# Patient Record
Sex: FEMALE | Race: WHITE
[De-identification: names, ages, dates, MRNs, and addresses within clinical notes are randomized per-mention and may not be internally consistent; named-entity substitution may affect disease eponyms.]

---

## 2019-03-29 ENCOUNTER — HOSPITAL ENCOUNTER (EMERGENCY)
Dept: HOSPITAL 10 - FTE | Age: 19
Discharge: HOME | End: 2019-03-29
Payer: SELF-PAY

## 2019-03-29 ENCOUNTER — HOSPITAL ENCOUNTER (EMERGENCY)
Dept: HOSPITAL 91 - FTE | Age: 19
Discharge: HOME | End: 2019-03-29
Payer: SELF-PAY

## 2019-03-29 VITALS
HEIGHT: 61 IN | WEIGHT: 130.29 LBS | HEIGHT: 61 IN | BODY MASS INDEX: 24.6 KG/M2 | BODY MASS INDEX: 24.6 KG/M2 | WEIGHT: 130.29 LBS

## 2019-03-29 VITALS — SYSTOLIC BLOOD PRESSURE: 128 MMHG | DIASTOLIC BLOOD PRESSURE: 78 MMHG | RESPIRATION RATE: 16 BRPM | HEART RATE: 77 BPM

## 2019-03-29 DIAGNOSIS — R30.0: Primary | ICD-10-CM

## 2019-03-29 LAB
URINE LEUKOCYTE EST (DIP) POC: (no result)
URINE PH (DIP) POC: 7 (ref 5–8.5)

## 2019-03-29 PROCEDURE — 96372 THER/PROPH/DIAG INJ SC/IM: CPT

## 2019-03-29 PROCEDURE — 99284 EMERGENCY DEPT VISIT MOD MDM: CPT

## 2019-03-29 PROCEDURE — 81025 URINE PREGNANCY TEST: CPT

## 2019-03-29 PROCEDURE — 81003 URINALYSIS AUTO W/O SCOPE: CPT

## 2019-03-29 RX ADMIN — KETOROLAC TROMETHAMINE 1 MG: 30 INJECTION, SOLUTION INTRAMUSCULAR at 10:18

## 2019-03-29 NOTE — ERD
ER Documentation


Chief Complaint


Chief Complaint





DYSURIA AND LOWER ABD PAIN X 3 DAYS.





HPI


18-year-old female patient with no significant past medical history presents to 


ED complaining of dysuria that started 3 days ago.  Reports that she had some 


suprapubic pain that she describes as sharp.  Hurts when she is pain, she has a 


burning sensation.  Patient rates her pain a 5 out of 10.  Reports that she just


got an IUD about 2 months ago however she is not complaining of any vaginal 


bleeding or vaginal discharge, abdominal pain, nausea, vomiting, fever.  Patient


reports that she is sexually active, unprotected sex.  Denies any concerns for 


STDs.  Is any chest pain, shortness of breath.





ROS


All systems reviewed and are negative except as per history of present illness.





Medications


Home Meds


Active Scripts


Nitrofurantoin Monohyd Macrocr (Macrobid) 100 Mg Capsr, 100 MG PO BID for 7 


Days, CAP


   Prov:KELVIN COHEN PA-C         3/29/19


Phenazopyridine Hcl* (Pyridium*) 100 Mg Tab, 100 MG PO TID PRN for URINARY PAIN,


#8 TAB


   Prov:KELVIN COHEN PA-C         3/29/19





PMhx/Soc


Medical and Surgical Hx:  pt denies Medical Hx, pt denies Surgical Hx


Hx Alcohol Use:  No


Hx Substance Use:  No


Hx Tobacco Use:  No


Smoking Status:  Never smoker





FmHx


Family History:  No diabetes, No coronary disease





Physical Exam


Vitals





Vital Signs


  Date      Temp  Pulse  Resp  B/P (MAP)   Pulse Ox  O2          O2 Flow    FiO2


Time                                                 Delivery    Rate


   3/29/19  97.6     67    16      136/80        99


     08:01                           (98)





Physical Exam


Const: Non-ill-appearing, well-nourished. In no acute distress.


Head: Atraumatic, normocephalic 


Eyes: Normal Conjunctiva without injection. No purulent discharge. 


ENT: Normal external ear, nose. Moist oropharynx without tonsillar exudates. Non


-erythematous pharynx. Uvula midline. No drooling.  No trismus. 


Neck: No cervical midline tenderness.  Full range of motion. No meningismus. No 


cervical lymphadenopathy. No JVD.


Resp: Clear to auscultation bilaterally. No wheezing, rhonchi, rales, or crac


kles. No accessory muscle use. No retractions.


Cardio: Regular rate and rhythm.  No murmurs, rubs or gallops.


Abd: Soft, slight suprapubic tenderness non distended. Normal bowel sounds.  No 


palpable masses.  No rebound tenderness.  No guarding.  Negative McBurney's 


point. Negative psoas sign. Negative obturator sign.


Skin: No petechiae or rashes


Back: No midline tenderness. No CVA tenderness.


Ext: No cyanosis, or edema.


Neur: Awake and alert. Normal gait. Normal coordination. 


Psych: Normal Mood and Affect


Results 24 hrs





Laboratory Tests


       Test
                                3/29/19
10:00  3/29/19
10:01


       Bedside Urine pH (LAB)                        7.0


       Bedside Urine Protein (LAB)          Negative


       Bedside Urine Glucose (UA)           Negative


       Bedside Urine Ketones (LAB)          Negative


       Bedside Urine Blood                  Trace-lysed


       Bedside Urine Nitrite (LAB)          Negative


       Bedside Urine Leukocyte
Esterase (L           3+ 
  



       POC Beta HCG, Qualitative                           NEGATIVE





Current Medications


 Medications
   Dose
          Sig/Mary
       Start Time
   Status  Last


 (Trade)       Ordered        Route
 PRN     Stop Time              Admin
Dose


                              Reason                                Admin


 Ketorolac
     30 mg          ONCE  STAT
    3/29/19       DC           3/29/19


Tromethamine
                 IM
            09:45
                       10:18



 (Toradol)                                   3/29/19 09:47








Procedures/MDM


18-year-old female patient with no significant past medical history presents to 


ED complaining of dysuria, burning with urination.  Patient is afebrile and n


ontoxic-appearing.  Patient was noted to have 3+ leukocyte esterase noted on her


urine dip.  Urine pregnancy negative.  Patient likely has a urinary tract 


infection.  Patient is appropriate for outpatient management with antibiotics 


and Pyridium.  Low suspicion for ectopic pregnancy, ovarian torsion, gastritis, 


GERD, peptic ulcer disease, cholecystitis, choledocholithiasis, cholangitis, 


pancreatitis, appendicitis, bowel obstruction, ileus, volvulus, nephrolithiasis,


pyelonephritis, hepatitis, perforated viscus, diverticulitis, 


strangulated/incarcerated hernia, DKA, acute abdomen, mesenteric ischemia or 


other emergent conditions.





Diagnosis: Dysuria


Discharge medications: Macrobid, Pyridium


Follow up with primary care physician in 1-2 days for referral to gas


troenterologist. Instructed patient to return to the ED sooner for any worsening


symptoms. Patient's questions were answered. Patient understood and agreed with 


discharge plan. Patient discharged stable.





Departure


Diagnosis:  


   Primary Impression:  


   Dysuria


Condition:  Stable


Patient Instructions:  Dysuria, Urinary Tract Infections in Women


Referrals:  


Formerly [facility], [facility]


YOU HAVE RECEIVED A MEDICAL SCREENING EXAM AND THE RESULTS INDICATE THAT YOU DO 


NOT HAVE A CONDITION THAT REQUIRES URGENT TREATMENT IN THE EMERGENCY DEPARTMENT.





FURTHER EVALUATION AND TREATMENT OF YOUR CONDITION CAN WAIT UNTIL YOU ARE SEEN 


IN YOUR DOCTORS OFFICE WITHIN THE NEXT 1-2 DAYS. IT IS YOUR RESPONSIBILITY TO 


MAKE AN APPOINTMENT FOR FOLOW-UP CARE.





IF YOU HAVE A PRIMARY DOCTOR


--you should call your primary doctor and schedule an appointment





IF YOU DO NOT HAVE A PRIMARY DOCTOR YOU CAN CALL OUR PHYSICIAN REFERRAL HOTLINE 


AT


 [phone] 





IF YOU CAN NOT AFFORD TO SEE A PHYSICIAN YOU CAN CHOSE FROM THE FOLLOWING 


[facility] [phone] [address] [facility] [phone] [address] [facility]. [facility] [phone] [address] [facility] [phone] [address] [facility] [phone] [address]. [facility]. [phone] [address] [facility]


YOU HAVE RECEIVED A MEDICAL SCREENING EXAM AND THE RESULTS INDICATE THAT YOU DO 


NOT HAVE A CONDITION THAT REQUIRES URGENT TREATMENT IN THE EMERGENCY DEPARTMENT.





FURTHER EVALUATION AND TREATMENT OF YOUR CONDITION CAN WAIT UNTIL YOU ARE SEEN 


IN YOUR DOCTORS OFFICE WITHIN THE NEXT 1-2 DAYS. IT IS YOUR RESPONSIBILITY TO 


MAKE AN APPOINTMENT FOR FOLOW-UP CARE.





IF YOU HAVE A PRIMARY DOCTOR


--you should call your primary doctor and schedule and appointment





IF YOU DO NOT HAVE A PRIMARY DOCTOR YOU CAN CALL OUR PHYSICIAN REFERRAL HOTLINE 


AT [phone].





IF YOU CAN NOT AFFORD TO SEE A PHYSICIAN YOU CAN CHOSE FROM THE FOLLOWING [facility]:





[facility]


[address] [place] [address]





[facility]


[address] [place] [address]





[facility]


[address] [place] [address]





[facility]





Additional Instructions:  


Call your primary care doctor TOMORROW for an appointment during the next 2-3 


days.See the doctor sooner or return here if your condition worsens before your 


appointment time.











KELVIN COHEN PA-C              Mar 29, 2019 10:48

## 2019-04-11 ENCOUNTER — HOSPITAL ENCOUNTER (EMERGENCY)
Dept: HOSPITAL 10 - FTE | Age: 19
Discharge: HOME | End: 2019-04-11
Payer: SELF-PAY

## 2019-04-11 ENCOUNTER — HOSPITAL ENCOUNTER (EMERGENCY)
Dept: HOSPITAL 91 - FTE | Age: 19
Discharge: HOME | End: 2019-04-11
Payer: SELF-PAY

## 2019-04-11 VITALS — SYSTOLIC BLOOD PRESSURE: 104 MMHG | HEART RATE: 53 BPM | RESPIRATION RATE: 20 BRPM | DIASTOLIC BLOOD PRESSURE: 56 MMHG

## 2019-04-11 VITALS
WEIGHT: 128.75 LBS | HEIGHT: 66 IN | BODY MASS INDEX: 20.69 KG/M2 | WEIGHT: 128.75 LBS | BODY MASS INDEX: 20.69 KG/M2 | HEIGHT: 66 IN

## 2019-04-11 DIAGNOSIS — N39.0: Primary | ICD-10-CM

## 2019-04-11 LAB
ADD UMIC: YES
UR ASCORBIC ACID: 20 MG/DL
UR BACTERIA: (no result) /HPF
UR BILIRUBIN (DIP): NEGATIVE MG/DL
UR BLOOD (DIP): NEGATIVE MG/DL
UR CLARITY: CLEAR
UR COLOR: YELLOW
UR GLUCOSE (DIP): NEGATIVE MG/DL
UR KETONES (DIP): NEGATIVE MG/DL
UR LEUKOCYTE ESTERASE (DIP): (no result) LEU/UL
UR NITRITE (DIP): NEGATIVE MG/DL
UR PH (DIP): 8 (ref 5–9)
UR RBC: 0 /HPF (ref 0–5)
UR SPECIFIC GRAVITY (DIP): 1 (ref 1–1.03)
UR TOTAL PROTEIN (DIP): NEGATIVE MG/DL
UR UROBILINOGEN (DIP): NEGATIVE MG/DL
UR WBC: 33 /HPF (ref 0–5)

## 2019-04-11 PROCEDURE — 81025 URINE PREGNANCY TEST: CPT

## 2019-04-11 PROCEDURE — 99283 EMERGENCY DEPT VISIT LOW MDM: CPT

## 2019-04-11 PROCEDURE — 81001 URINALYSIS AUTO W/SCOPE: CPT

## 2019-04-11 NOTE — ERD
ER Documentation


Chief Complaint


Chief Complaint





Complains of urine problems x 1 week





HPI


17yo F presents with complaint of dysuria and suprapubic pressure x 1 week. Pt 


previously presented 2 weeks ago for similar symptoms and was given macrobid and


pyridium. Pt notes improvement in symptoms, while on treatment but notes 


symptoms returned 1 week ago. Pt admits to bubble bath prior to onset. Hx of 


IUD. Denies pelvic pain, denies vaginal bleeding or clotting.





ROS


All systems reviewed and are negative except as per history of present illness.





Medications


Home Meds


Active Scripts


Phenazopyridine Hcl* (Pyridium*) 100 Mg Tab, 100 MG PO TID PRN for DYSURIA for 2


Days, #6 TAB


   Prov:DYLAN FOSTER PA-C         4/11/19


Sulfamethoxazole/Trimethoprim* (Bactrim Ds* Tablet) 1 Each Tablet, 1 TAB PO BID,


#14 TAB


   Prov:DYLAN FOSTER PA-C         4/11/19


Nitrofurantoin Monohyd Macrocr (Macrobid) 100 Mg Capsr, 100 MG PO BID for 7 


Days, CAP


   Prov:KELVIN COHEN PA-C         3/29/19


Phenazopyridine Hcl* (Pyridium*) 100 Mg Tab, 100 MG PO TID PRN for URINARY PAIN,


#8 TAB


   Prov:KELVIN COHEN PA-C         3/29/19





PMhx/Soc


Medical and Surgical Hx:  pt denies Medical Hx, pt denies Surgical Hx


Hx Alcohol Use:  No


Hx Substance Use:  No


Hx Tobacco Use:  No


Smoking Status:  Never smoker





Physical Exam


Vitals





Vital Signs


  Date      Temp  Pulse  Resp  B/P (MAP)   Pulse Ox  O2          O2 Flow    FiO2


Time                                                 Delivery    Rate


   4/11/19  98.4     53    20      104/56        97


     08:49                           (72)





Physical Exam


Const:   No acute distress


Head:   Atraumatic 


Eyes:    Normal Conjunctiva


ENT:    Normal External Ears, Nose and Mouth.


Neck:               Full range of motion. No meningismus.


Resp:   Clear to auscultation bilaterally


Cardio:   Regular rate and rhythm, no murmurs


Abd:    Soft, non tender, non distended. Normal bowel sounds. No RLQ TTP. TTP 


suprapubic region.


Skin:   No petechiae or rashes


Back:   No midline or flank tenderness. No CVA tenderness.


Ext:    No cyanosis, or edema


Neur:   Awake and alert


Psych:    Normal Mood and Affect


Results 24 hrs





Laboratory Tests


           Test
                        4/11/19
10:38  4/11/19
10:39


           Urine Color                YELLOW


           Urine Clarity              CLEAR


           Urine pH                              8.0


           Urine Specific Gravity              1.004


           Urine Ketones              NEGATIVE mg/dL


           Urine Nitrite              NEGATIVE mg/dL


           Urine Bilirubin            NEGATIVE mg/dL


           Urine Urobilinogen         NEGATIVE mg/dL


           Urine Leukocyte Esterase        1+ Sarah/ul


           Urine Microscopic RBC              0 /HPF


           Urine Microscopic WBC             33 /HPF


           Urine Bacteria             FEW /HPF


           Urine Hemoglobin           NEGATIVE mg/dL


           Urine Glucose              NEGATIVE mg/dL


           Urine Total Protein        NEGATIVE mg/dl


           POC Beta HCG, Qualitative                   NEGATIVE








Procedures/MDM


Urine preg negative. UA positive for 1+ leuks. Physical exam findings consistent


with urinary tract infection. Very low suspicion pyelonephritis or 


nephrolithiasis given unremarkable physical exam, normal vitals, and pt 


afebrile. Pt stable for discharge at this time. Will be discharged with bactrim 


and short course pyridum. Counseled regarding urinary/vaginal health. Advised to


f/u with ob/gyn and or PCP within next few days.





Departure


Diagnosis:  


   Primary Impression:  


   Urinary tract infection


   Urinary tract infection type:  site unspecified  Hematuria presence:  without


   hematuria  Qualified Codes:  N39.0 - Urinary tract infection, site not 


   specified


Condition:  Stable











DYLAN FOSTER PA-C            Apr 11, 2019 10:31